# Patient Record
Sex: FEMALE | Race: WHITE | NOT HISPANIC OR LATINO | URBAN - NONMETROPOLITAN AREA
[De-identification: names, ages, dates, MRNs, and addresses within clinical notes are randomized per-mention and may not be internally consistent; named-entity substitution may affect disease eponyms.]

---

## 2019-01-01 ENCOUNTER — APPOINTMENT (RX ONLY)
Dept: URBAN - NONMETROPOLITAN AREA CLINIC 13 | Facility: CLINIC | Age: 84
Setting detail: DERMATOLOGY
End: 2019-01-01

## 2019-01-01 ENCOUNTER — APPOINTMENT (RX ONLY)
Dept: URBAN - NONMETROPOLITAN AREA CLINIC 4 | Facility: CLINIC | Age: 84
Setting detail: DERMATOLOGY
End: 2019-01-01

## 2019-01-01 VITALS — HEIGHT: 63 IN | WEIGHT: 120 LBS

## 2019-01-01 VITALS
WEIGHT: 112 LBS | DIASTOLIC BLOOD PRESSURE: 80 MMHG | SYSTOLIC BLOOD PRESSURE: 146 MMHG | HEART RATE: 67 BPM | HEIGHT: 60 IN

## 2019-01-01 DIAGNOSIS — L57.0 ACTINIC KERATOSIS: ICD-10-CM

## 2019-01-01 DIAGNOSIS — L259 CONTACT DERMATITIS AND OTHER ECZEMA, UNSPECIFIED CAUSE: ICD-10-CM | Status: WORSENING

## 2019-01-01 DIAGNOSIS — Z85.828 PERSONAL HISTORY OF OTHER MALIGNANT NEOPLASM OF SKIN: ICD-10-CM

## 2019-01-01 DIAGNOSIS — L82.1 OTHER SEBORRHEIC KERATOSIS: ICD-10-CM

## 2019-01-01 DIAGNOSIS — L57.8 OTHER SKIN CHANGES DUE TO CHRONIC EXPOSURE TO NONIONIZING RADIATION: ICD-10-CM

## 2019-01-01 PROCEDURE — ? LIQUID NITROGEN

## 2019-01-01 PROCEDURE — 99213 OFFICE O/P EST LOW 20 MIN: CPT | Mod: 25

## 2019-01-01 PROCEDURE — 96405 CHEMO INTRALESIONAL UP TO 7: CPT

## 2019-01-01 PROCEDURE — ? COUNSELING

## 2019-01-01 PROCEDURE — 99212 OFFICE O/P EST SF 10 MIN: CPT

## 2019-01-01 PROCEDURE — 17000 DESTRUCT PREMALG LESION: CPT

## 2019-01-01 PROCEDURE — ? SUNSCREEN RECOMMENDATIONS

## 2019-01-01 PROCEDURE — 17003 DESTRUCT PREMALG LES 2-14: CPT

## 2019-01-01 PROCEDURE — 17273 DSTR MAL LES S/N/H/F/G 2.1-3: CPT | Mod: 59

## 2019-01-01 PROCEDURE — ? INTRALESIONAL CHEMOTHERAPY INJECTION

## 2019-01-01 PROCEDURE — ? OTHER

## 2019-01-01 PROCEDURE — ? BIOPSY BY SHAVE METHOD AND DESTRUCTION

## 2019-01-01 PROCEDURE — 99214 OFFICE O/P EST MOD 30 MIN: CPT | Mod: 25

## 2019-01-01 PROCEDURE — ? OBSERVATION

## 2019-01-01 PROCEDURE — ? TREATMENT REGIMEN

## 2019-01-01 ASSESSMENT — LOCATION DETAILED DESCRIPTION DERM
LOCATION DETAILED: LEFT CENTRAL MALAR CHEEK
LOCATION DETAILED: RIGHT CENTRAL MALAR CHEEK
LOCATION DETAILED: RIGHT RADIAL DORSAL HAND
LOCATION DETAILED: LEFT RADIAL DORSAL HAND
LOCATION DETAILED: LEFT ULNAR DORSAL HAND
LOCATION DETAILED: RIGHT MEDIAL FOREHEAD
LOCATION DETAILED: 1ST WEB SPACE RIGHT HAND
LOCATION DETAILED: LEFT RADIAL DORSAL HAND

## 2019-01-01 ASSESSMENT — LOCATION ZONE DERM
LOCATION ZONE: HAND
LOCATION ZONE: HAND
LOCATION ZONE: FACE
LOCATION ZONE: FACE
LOCATION ZONE: FINGER

## 2019-01-01 ASSESSMENT — LOCATION SIMPLE DESCRIPTION DERM
LOCATION SIMPLE: RIGHT FOREHEAD
LOCATION SIMPLE: RIGHT CHEEK
LOCATION SIMPLE: LEFT CHEEK
LOCATION SIMPLE: RIGHT HAND
LOCATION SIMPLE: LEFT HAND
LOCATION SIMPLE: LEFT HAND
LOCATION SIMPLE: RIGHT THUMB

## 2019-01-22 PROBLEM — C44.629 SQUAMOUS CELL CARCINOMA OF SKIN OF LEFT UPPER LIMB, INCLUDING SHOULDER: Status: ACTIVE | Noted: 2019-01-01

## 2019-01-22 PROBLEM — E13.9 OTHER SPECIFIED DIABETES MELLITUS WITHOUT COMPLICATIONS: Status: ACTIVE | Noted: 2019-01-01

## 2019-01-22 PROBLEM — C18.9 MALIGNANT NEOPLASM OF COLON, UNSPECIFIED: Status: ACTIVE | Noted: 2019-01-01

## 2019-01-22 PROBLEM — L82.1 OTHER SEBORRHEIC KERATOSIS: Status: ACTIVE | Noted: 2019-01-01

## 2019-01-22 PROBLEM — L85.3 XEROSIS CUTIS: Status: ACTIVE | Noted: 2019-01-01

## 2019-01-22 PROBLEM — L29.8 OTHER PRURITUS: Status: ACTIVE | Noted: 2019-01-01

## 2019-01-22 PROBLEM — L57.0 ACTINIC KERATOSIS: Status: ACTIVE | Noted: 2019-01-01

## 2019-01-22 PROBLEM — L57.8 OTHER SKIN CHANGES DUE TO CHRONIC EXPOSURE TO NONIONIZING RADIATION: Status: ACTIVE | Noted: 2019-01-01

## 2019-01-22 PROBLEM — M12.9 ARTHROPATHY, UNSPECIFIED: Status: ACTIVE | Noted: 2019-01-01

## 2019-01-22 NOTE — PROCEDURE: BIOPSY BY SHAVE METHOD AND DESTRUCTION
Bill As?: Malignant Destruction
Lab: [id number]
Bill For Surgical Tray: no
Destruction Type: electrodesiccation
Anesthesia Type: 1% lidocaine with epinephrine
Post-Care Instructions: I reviewed with the patient in detail post-care instructions. Patient is to keep the biopsy site dry overnight, and then apply petrolatum daily until healed. Patient may apply hydrogen peroxide soaks to remove any crusting.
Detail Level: Detailed
Size Of Lesion In Cm (Optional): 2.5
Biopsy Type: H and E
Wound Care: Petrolatum
Billing Type: Third-Party Bill
Consent: Verbal consent was obtained and risks were reviewed including but not limited to scarring, infection, bleeding, scabbing, incomplete removal, nerve damage and allergy to anesthesia.
Hemostasis: TCA 35%
Lab Facility: [id number]
Number Of Curettages: 3
Notification Instructions: Patient will be notified of biopsy results. However, patient instructed to call the office if not contacted within 2 weeks.
Anesthesia Volume In Cc: 0.5
Size Of Lesion After Curettage: 2.8

## 2019-03-13 PROBLEM — C44.629 SQUAMOUS CELL CARCINOMA OF SKIN OF LEFT UPPER LIMB, INCLUDING SHOULDER: Status: ACTIVE | Noted: 2019-01-01

## 2019-03-13 NOTE — PROCEDURE: MIPS QUALITY
Quality 111:Pneumonia Vaccination Status For Older Adults: Pneumococcal Vaccination Previously Received
Quality 226: Preventive Care And Screening: Tobacco Use: Screening And Cessation Intervention: Patient screened for tobacco use and is an ex/non-smoker
Detail Level: Detailed
Quality 110: Preventive Care And Screening: Influenza Immunization: Influenza Immunization Administered during Influenza season

## 2019-03-13 NOTE — PROCEDURE: INTRALESIONAL CHEMOTHERAPY INJECTION
Body Location Override (Optional - Billing Will Still Be Based On Selected Body Map Location If Applicable): left medial dorsal hand
Size Of Lesion In Cm (Optional): 2.8
Expiration Date (Optional): 03/2020
Bill J-Code?: Yes
Post-Care Instructions: I reviewed with the patient in detail post-care instructions. Patient is to keep the site dry overnight, and then apply bacitracin twice daily until healed. Patient may apply hydrogen peroxide soaks to remove any crusting.
Bill As A Line Item Or As Units: Line Item
Total Volume (Ccs): 1
Detail Level: Detailed
Render Post-Care Instructions In Note?: no
X Size Of Lesion In Cm (Optional): 0
Concentration (Mg/Ml Or Units/Ml): 50.0
Consent: The risks of medication reaction, injection site pain and lesion necrosis were reviewed with the patient.
Medication Injected: 5-Fluorouracil
Lot # (Optional): [id number]

## 2019-04-03 PROBLEM — C44.629 SQUAMOUS CELL CARCINOMA OF SKIN OF LEFT UPPER LIMB, INCLUDING SHOULDER: Status: ACTIVE | Noted: 2019-01-01

## 2019-04-03 NOTE — PROCEDURE: INTRALESIONAL CHEMOTHERAPY INJECTION
Expiration Date (Optional): 03/2020
Detail Level: Detailed
Post-Care Instructions: I reviewed with the patient in detail post-care instructions. Patient is to keep the site dry overnight, and then apply bacitracin twice daily until healed. Patient may apply hydrogen peroxide soaks to remove any crusting.
X Size Of Lesion In Cm (Optional): 0
Total Volume (Ccs): 1
Administered By (Optional): Dr. Machado
Bill As A Line Item Or As Units: Line Item
Bill J-Code?: Yes
Consent: The risks of medication reaction, injection site pain and lesion necrosis were reviewed with the patient.
Lot # (Optional): [id number]
Size Of Lesion In Cm (Optional): 2
Body Location Override (Optional - Billing Will Still Be Based On Selected Body Map Location If Applicable): left medial dorsal hand
Medication Injected: 5-Fluorouracil
Render Post-Care Instructions In Note?: no
Concentration (Mg/Ml Or Units/Ml): 50.0

## 2019-04-17 PROBLEM — C44.629 SQUAMOUS CELL CARCINOMA OF SKIN OF LEFT UPPER LIMB, INCLUDING SHOULDER: Status: ACTIVE | Noted: 2019-01-01

## 2019-04-17 NOTE — PROCEDURE: INTRALESIONAL CHEMOTHERAPY INJECTION
Consent: The risks of medication reaction, injection site pain and lesion necrosis were reviewed with the patient.
Body Location Override (Optional - Billing Will Still Be Based On Selected Body Map Location If Applicable): left medial dorsal hand
Size Of Lesion In Cm (Optional): 1.7
Lot # (Optional): [id number]
Render Post-Care Instructions In Note?: no
Medication Injected: 5-Fluorouracil
Detail Level: Detailed
X Size Of Lesion In Cm (Optional): 0
Expiration Date (Optional): 05/2020
Post-Care Instructions: I reviewed with the patient in detail post-care instructions. Patient is to keep the site dry overnight, and then apply bacitracin twice daily until healed. Patient may apply hydrogen peroxide soaks to remove any crusting.
Total Volume (Ccs): 1
Concentration (Mg/Ml Or Units/Ml): 50.0
Bill J-Code?: Yes
Bill As A Line Item Or As Units: Line Item

## 2019-05-01 PROBLEM — C44.629 SQUAMOUS CELL CARCINOMA OF SKIN OF LEFT UPPER LIMB, INCLUDING SHOULDER: Status: ACTIVE | Noted: 2019-01-01

## 2019-05-01 NOTE — PROCEDURE: OTHER
Detail Level: Zone
Note Text (......Xxx Chief Complaint.): This diagnosis correlates with the
Other (Free Text): Lesion debulked with a Lorin blade prior to injection.

## 2019-05-01 NOTE — PROCEDURE: INTRALESIONAL CHEMOTHERAPY INJECTION
Render Post-Care Instructions In Note?: no
X Size Of Lesion In Cm (Optional): 0
Total Volume (Ccs): 1
Consent: The risks of medication reaction, injection site pain and lesion necrosis were reviewed with the patient.
Medication Injected: 5-Fluorouracil
Concentration (Mg/Ml Or Units/Ml): 50.0
Body Location Override (Optional - Billing Will Still Be Based On Selected Body Map Location If Applicable): left medial dorsal hand
Lot # (Optional): [id number]
Detail Level: Detailed
Expiration Date (Optional): 05/2020
Post-Care Instructions: I reviewed with the patient in detail post-care instructions. Patient is to keep the site dry overnight, and then apply bacitracin twice daily until healed. Patient may apply hydrogen peroxide soaks to remove any crusting.
Size Of Lesion In Cm (Optional): 1.5
Bill J-Code?: Yes
Bill As A Line Item Or As Units: Line Item

## 2019-05-22 PROBLEM — C44.629 SQUAMOUS CELL CARCINOMA OF SKIN OF LEFT UPPER LIMB, INCLUDING SHOULDER: Status: ACTIVE | Noted: 2019-01-01

## 2019-05-22 NOTE — PROCEDURE: INTRALESIONAL CHEMOTHERAPY INJECTION
Bill J-Code?: Yes
Bill As A Line Item Or As Units: Line Item
Expiration Date (Optional): 05/2020
Total Volume (Ccs): 1
X Size Of Lesion In Cm (Optional): 0
Detail Level: Detailed
Post-Care Instructions: I reviewed with the patient in detail post-care instructions. Patient is to keep the site dry overnight, and then apply bacitracin twice daily until healed. Patient may apply hydrogen peroxide soaks to remove any crusting.
Concentration (Mg/Ml Or Units/Ml): 50.0
Medication Injected: 5-Fluorouracil
Render Post-Care Instructions In Note?: no
Lot # (Optional): [id number]
Body Location Override (Optional - Billing Will Still Be Based On Selected Body Map Location If Applicable): left medial dorsal hand
Consent: The risks of medication reaction, injection site pain and lesion necrosis were reviewed with the patient.
Treatment Number (Optional): 5
Size Of Lesion In Cm (Optional): 1.2

## 2019-05-22 NOTE — HPI: EVALUATION OF SKIN LESION(S)
How Severe Are Your Spot(S)?: mild
Have Your Spot(S) Been Treated In The Past?: has been treated
Hpi Title: Evaluation of a Skin Lesion
When Was It Treated?: Last on 5/1/19

## 2019-06-12 PROBLEM — C44.629 SQUAMOUS CELL CARCINOMA OF SKIN OF LEFT UPPER LIMB, INCLUDING SHOULDER: Status: ACTIVE | Noted: 2019-01-01

## 2019-06-12 NOTE — PROCEDURE: OBSERVATION
Morphology Per Location (Optional): Resolving SCC
Detail Level: Detailed
Size Of Lesion In Cm (Optional): 0

## 2019-06-12 NOTE — HPI: WOUND CHECK
How Is Your Wound Healing?: healing well
Additional History: Treating with 5FU injections, treatment #5 done on 05/22/19

## 2019-09-04 PROBLEM — L23.9 ALLERGIC CONTACT DERMATITIS, UNSPECIFIED CAUSE: Status: ACTIVE | Noted: 2019-01-01

## 2019-09-04 PROBLEM — Z85.828 PERSONAL HISTORY OF OTHER MALIGNANT NEOPLASM OF SKIN: Status: ACTIVE | Noted: 2019-01-01

## 2019-09-04 PROBLEM — L57.0 ACTINIC KERATOSIS: Status: ACTIVE | Noted: 2019-01-01

## 2019-09-04 NOTE — PROCEDURE: LIQUID NITROGEN
Duration Of Freeze Thaw-Cycle (Seconds): 2
Render Note In Bullet Format When Appropriate: No
Consent: The patient's consent was obtained including but not limited to risks of crusting, scabbing, blistering, scarring, darker or lighter pigmentary change, recurrence, incomplete removal and infection.
Detail Level: Detailed
Post-Care Instructions: I reviewed with the patient in detail post-care instructions. Patient is to wear sunprotection, and avoid picking at any of the treated lesions. Pt may apply Vaseline to crusted or scabbing areas.

## 2019-09-04 NOTE — HPI: RASH
How Severe Is Your Rash?: mild
Is This A New Presentation, Or A Follow-Up?: Rash
Additional History: Taking oral antibiotics and eye drops for eye infection.